# Patient Record
(demographics unavailable — no encounter records)

---

## 2022-07-02 RX ORDER — BUPROPION HYDROCHLORIDE 150 MG/1
TABLET ORAL
COMMUNITY
Start: 2021-11-17 | End: 2022-10-19 | Stop reason: ALTCHOICE

## 2022-07-02 RX ORDER — ASPIRIN 81 MG/1
TABLET, CHEWABLE ORAL
COMMUNITY

## 2022-07-02 RX ORDER — BUSPIRONE HYDROCHLORIDE 5 MG/1
TABLET ORAL
COMMUNITY
Start: 2021-11-17 | End: 2022-10-19 | Stop reason: ALTCHOICE

## 2022-07-02 RX ORDER — ZOLPIDEM TARTRATE 12.5 MG/1
TABLET, FILM COATED, EXTENDED RELEASE ORAL
COMMUNITY
Start: 2021-11-21 | End: 2022-10-19 | Stop reason: SDUPTHER

## 2022-07-22 LAB
ANION GAP SERPL CALC-SCNC: 11 MMOL/L (ref 2–17)
BASOPHILS # BLD AUTO: 0 X10E3/MCL (ref 0–0.2)
BASOPHILS NFR BLD AUTO: 0.2 % (ref 0–2)
BUN SERPL-MCNC: 10 MG/DL (ref 8–23)
CALCIUM SERPL-MCNC: 9.5 MG/DL (ref 8.8–10.2)
CHLORIDE SERPL-SCNC: 101 MMOL/L (ref 98–107)
CREAT SERPL-MCNC: 0.9 MG/DL (ref 0.5–1)
DEPRECATED HCO3 PLAS-SCNC: 27 MMOL/L (ref 22–29)
EOSINOPHIL # BLD AUTO: 0.4 X10E3/MCL (ref 0–0.5)
EOSINOPHIL NFR BLD AUTO: 2.8 % (ref 0–7)
ERYTHROCYTE [DISTWIDTH] IN BLOOD BY AUTOMATED COUNT: 13.1 % (ref 11–16)
GFR SERPL CREATININE-BSD FRML MDRD: 71 ML/MIN/1.73M²
GLUCOSE SERPL-MCNC: 200 MG/DL (ref 70–99)
HCT VFR BLD AUTO: 45.7 % (ref 34–47)
HGB BLD-MCNC: 15 G/DL (ref 11.5–15.7)
IMMATURE GRANS (ABS): 0.09 X10E3/MCL (ref 0–0.06)
IMMATURE GRANULOCYTES: 0.7 % (ref 0–0.6)
LYMPHOCYTES # SPEC AUTO: 2.1 X10E3/MCL (ref 1–3.2)
LYMPHOCYTES NFR BLD AUTO: 15.4 % (ref 15–45)
MCH RBC QN AUTO: 30.7 PG (ref 27–34.5)
MCHC RBC AUTO-ENTMCNC: 32.8 G/DL (ref 32–36)
MCV RBC AUTO: 93.6 FL (ref 81–99)
MONOCYTES # BLD AUTO: 0.9 X10E3/MCL (ref 0.3–1)
MONOCYTES NFR BLD AUTO: 6.9 % (ref 4–12)
NEUTROPHILS # BLD AUTO: 10 X10E3/MCL (ref 1.6–7.3)
NEUTROPHILS NFR BLD AUTO: 74 % (ref 42–74)
NT-PROBNP SERPL-MCNC: 55 PG/ML (ref 0–125)
OSMOLALITY SERPL CALC.SUM OF ELEC: 282 MOSM/KG (ref 270–287)
PLATELET # BLD AUTO: 262 X10E3/MCL (ref 140–440)
PMV BLD AUTO: 10.6 FL (ref 7.2–13.2)
POTASSIUM SERPL-SCNC: 3.9 MMOL/L (ref 3.5–5.3)
RBC # BLD AUTO: 4.88 X10E6/MCL (ref 3.6–5.2)
SODIUM SERPL-SCNC: 139 MMOL/L (ref 135–145)
WBC # BLD AUTO: 13.5 X10E3/MCL (ref 3.8–10.6)

## 2022-08-08 PROBLEM — J30.9 ALLERGIC RHINITIS: Status: ACTIVE | Noted: 2022-08-08

## 2022-08-08 PROBLEM — R20.2 PARESTHESIA: Status: ACTIVE | Noted: 2022-08-08

## 2022-08-08 PROBLEM — M54.41 LUMBAGO WITH SCIATICA, RIGHT SIDE: Status: ACTIVE | Noted: 2022-08-08

## 2022-08-08 PROBLEM — E55.9 VITAMIN D DEFICIENCY: Status: ACTIVE | Noted: 2022-08-08

## 2022-08-08 PROBLEM — G89.29 OTHER CHRONIC PAIN: Status: ACTIVE | Noted: 2022-08-08

## 2022-08-08 PROBLEM — F51.01 PRIMARY INSOMNIA: Status: ACTIVE | Noted: 2022-08-08

## 2022-08-08 PROBLEM — F17.200 TOBACCO USE DISORDER: Status: ACTIVE | Noted: 2022-08-08

## 2022-08-08 PROBLEM — K21.9 GERD WITHOUT ESOPHAGITIS: Status: ACTIVE | Noted: 2022-08-08

## 2022-08-08 PROBLEM — E53.8 VITAMIN B 12 DEFICIENCY: Status: ACTIVE | Noted: 2022-08-08

## 2022-10-17 NOTE — ED NOTES
ED Triage Note       ED Triage Adult Entered On:  7/22/2022 18:32 EDT    Performed On:  7/22/2022 18:31 EDT by Colleen Michelle RN, Adalid LUIS               Triage   Numeric Rating Pain Scale :   0 = No pain   Chief Complaint :   Pt ambultory to Ed with co fall 1 month ago. Pt states that she has swelling to right foot and is concerned for blood clot-sent from urgent care today    [place] [facility]) Mode of Arrival :   Private vehicle   Infectious Disease Documentation :   Document assessment   Patient received chemo or biotherapy last 48 hrs? :   No   Temperature Oral :   36.9 degC(Converted to: 98.4 degF)    Heart Rate Monitored :   70 bpm   Respiratory Rate :   18 br/min   Systolic Blood Pressure :   157 mmHg (HI)    Diastolic Blood Pressure :   99 mmHg (HI)    SpO2 :   96 %   Oxygen Therapy :   Room air   Patient presentation :   None of the above   Chief Complaint or Presentation suggest infection :   No   Dosing Weight Obtained By :   Patient stated   Weight Dosing :   82 kg(Converted to: 180 lb 12 oz)    Height :   165 cm(Converted to: 5 ft 5 in)    Body Mass Index Dosing :   30 kg/m2   Natalie LUIS - 7/22/2022 18:31 EDT   DCP GENERIC CODE   Tracking Acuity :   4   Tracking Group :   ED [id number] [place] [address], Adalid LUIS - 7/22/2022 18:31 EDT   ED General Section :   Document assessment   Pregnancy Status :   Patient denies   ED Allergies Section :   Document assessment   ED Reason for Visit Section :   Document assessment   ED Quick Assessment :   Patient appears awake, alert, oriented to baseline. Skin warm and dry. Moves all extremities. Respiration even and unlabored. Appears in no apparent distress.    Hoang Hooper RN, Adalid LUIS - 7/22/2022 18:31 EDT   ID Risk Screen Symptoms   Recent Travel History :   No recent travel   Last 14 days COVID-19 ID :   No   Natalie LUIS - 7/22/2022 18:31 EDT   Allergies   (As Of: 7/22/2022 18:32:56 EDT)   Allergies (Active)   No Known Medication Allergies  Estimated Onset Date: Unspecified ; Created By:   Xiang Shaw RN, [place]; Reaction Status:   Active ; Category:   Drug ; Substance:   No Known Medication Allergies ; Type: Allergy ; Updated By:   George Francois; Reviewed Date:   7/22/2022 18:32 EDT        Psycho-Social   Last 3 mo, thoughts killing self/others :   Patient denies   Right click within box for Suspected Abuse policy link. :   None   Feels Safe Where Live :   Yes   ED Behavioral Activity Rating Scale :   4 - Quiet and awake (normal level of activity)   George Francois - 7/22/2022 18:31 EDT   ED Reason for Visit   (As Of: 7/22/2022 18:32:56 EDT)   Diagnoses(Active)    Foot pain-swelling  Date:   7/22/2022 ; Diagnosis Type:   Reason For Visit ; Confirmation:   Complaint of ; Clinical Dx:    Foot pain-swelling ; Classification:   Medical ; Clinical Service:   Emergency medicine ; Code:   PNED ; Probability:   0 ; Diagnosis Code:   [id number]

## 2022-10-17 NOTE — ED NOTES
Patients who do not have a [facility] physician can call [phone] to schedule vaccination appointments. Follow Up Appointments:  Primary Care Provider:     Name: PCP,  NONE     Phone:                  With: Address: When:   Return to Emergency Department  In 1 day   Comments:   for DVT ultrasound of your Right leg              [address] SERVICES%>             Medications that have not changed  Other Medications  aspirin (Aspirin Low Dose 81 mg oral delayed release tablet) Oral (given by mouth) every day. Last Dose:____________________  levocetirizine (levocetirizine 5 mg oral tablet) TAKE 1 TABLET BY MOUTH ONCE DAILY. Last Dose:____________________  montelukast (montelukast 10 mg oral tablet) TAKE 1 TABLET BY MOUTH ONCE DAILY. Last Dose:____________________  zolpidem (zolpidem 12.5 mg oral tablet, extended release) TAKE 1 TABLET BY MOUTH ONCE DAILY AT BEDTIME AS NEEDED FOR 30 DAYS. Last Dose:____________________      Allergy Info: No Known Medication Allergies     Discharge Additional Information          Discharge Patient 07/22/22 19:53:00 EDT      Patient Education Materials:        Edema      Edema is when you have too much fluid in your body or under your skin. Edema may make your legs, feet, and ankles swell up. Swelling is also common in looser tissues, like around your eyes. This is a common condition. It gets more common as you get older. There are many possible causes of edema. Eating too much salt (sodium) and being on your feet or sitting for a long time can cause edema in your legs, feet, and ankles. Hot weather may make edema worse. Edema is usually painless. Your skin may look swollen or shiny. Follow these instructions at home:     Keep the swollen body part raised (elevated) above the level of your heart when you are sitting or lying down. Do not sit still or stand for a long time. Do not wear tight clothes.  Do not wear garters on your upper legs. Exercise your legs. This can help the swelling go down. Wear elastic bandages or support stockings as told by your doctor. Eat a low-salt (low-sodium) diet to reduce fluid as told by your doctor. Depending on the cause of your swelling, you may need to limit how much fluid you drink (fluid restriction). Take over-the-counter and prescription medicines only as told by your doctor. Contact a doctor if:     Treatment is not working. You have heart, liver, or kidney disease and have symptoms of edema. You have sudden and unexplained weight gain. Get help right away if:     You have shortness of breath or chest pain. You cannot breathe when you lie down. You have pain, redness, or warmth in the swollen areas. You have heart, liver, or kidney disease and get edema all of a sudden. You have a fever and your symptoms get worse all of a sudden. Summary     Edema is when you have too much fluid in your body or under your skin. Edema may make your legs, feet, and ankles swell up. Swelling is also common in looser tissues, like around your eyes. Raise (elevate) the swollen body part above the level of your heart when you are sitting or lying down. Follow your doctor's instructions about diet and how much fluid you can drink (fluid restriction). This information is not intended to replace advice given to you by your health care provider. Make sure you discuss any questions you have with your health care provider. Document Revised: 12/21/2018 Document Reviewed: 01/05/2018  Jack Patient Education ?  [facility]      ---------------------------------------------------------------------------------------------------------------------  [facility] allows patients to review your COVID and other test results as well as discharge documents from any [facility]. [facility], Emergency Department, surgical center or outpatient lab. Test results are typically available 36 hours after the test is completed. [address] encourages you to self-enroll in the [facility] Patient Portal.     To begin your self-enrollment process, please visit [email or web address] Under [facility], click on Sign up now. NOTE: You must be 16 years and older to use [facility] Self-Enroll online. If you are a parent, caregiver, or guardian; you need an invite to access your childs or dependents health records. To obtain an invite, contact the Medical Records department at [phone] Monday through Friday, 8-4:30, select option 3 . If we receive your call afterhours, we will return your call the next business day. If you have issues trying to create or access your account, contact [employer] at [phone] available 7 days a week 24 hours a day.      Comment:

## 2022-10-17 NOTE — ED PROVIDER NOTES
Foot pain-swelling        Patient:   Oneil Whitley            MRN: [id number]            FIN: [id number]               Age:   59 years     Sex:  Female     :  1957   Associated Diagnoses: Foot swelling; Leg swelling   Author:   Hollis MORALES      Basic Information   Time seen: Provider Seen (ST)   ED Provider/Time:    Jules Graham / 2022 19:04  . Additional information: Chief Complaint from Nursing Triage Note   Chief Complaint  Chief Complaint: Pt ambultory to Ed with co fall 1 month ago. Pt states that she has swelling to right foot and is concerned for blood clot-sent from urgent care today (22 18:31:00). History of Present Illness   79-year-old female presents for right lower extremity swelling x1 month after mechanical fall. Sent by urgent care for DVT study. Had negative R Lower extremity xrays just PTA. Patient also states her left foot has been intermittently swelling as well. Patient states she had a mechanical fall around 1 month ago hit her right shin on a fence has endorsed swelling ever since. No head trauma, LOC or dizziness associated with this fall. Complaining of mild discomfort and swelling. Swelling extends from right foot up to knee, mild lower extremity discomfort. Has been ambulatory but  this does cause some discomfort. She has no other associated symptoms. Denies right lower extremity numbness, tingling, chest pain, shortness of breath, abdominal pain, nausea, vomiting, diarrhea, hemoptysis, fevers, chills, recent open wounds. Denies history of DVT or PE, recent surgeries, hormone use, history of malignancy      Review of Systems   Constitutional symptoms:  No fever, no chills. Respiratory symptoms:  No shortness of breath, no orthopnea, no cough, no hemoptysis, no stridor, no wheezing. Cardiovascular symptoms:  Peripheral edema, No chest pain,    Gastrointestinal symptoms:  No abdominal pain, no nausea, no vomiting. Musculoskeletal symptoms:  Muscle pain. Neurologic symptoms:  No numbness, no tingling, no focal weakness. Additional review of systems information: All other systems reviewed and otherwise negative. Health Status   Allergies: Allergic Reactions (Selected)  No Known Medication Allergies. Past Medical/ Family/ Social History   Problem list:    No qualifying data available  . Physical Examination               Vital Signs   Vital Signs   9/15/1172 97:83 EDT Systolic Blood Pressure 439 mmHg  HI    Diastolic Blood Pressure 99 mmHg  HI    Temperature Oral 36.9 degC    Heart Rate Monitored 70 bpm    Respiratory Rate 18 br/min    SpO2 96 %   . Measurements   7/22/2022 18:32 EDT Body Mass Index est ralf 30.12 kg/m2    Body Mass Index Measured 30.12 kg/m2   7/22/2022 18:31 EDT Height/Length Measured 165 cm    Weight Dosing 82 kg   . Basic Oxygen Information   7/22/2022 18:31 EDT Oxygen Therapy Room air    SpO2 96 %   . General:  Alert, no acute distress. Skin:  Warm, dry, pink. Head:  Normocephalic. Neck:  Supple, trachea midline. Eye:  Pupils are equal, round and reactive to light, extraocular movements are intact. Ears, nose, mouth and throat:  Oral mucosa moist.   Cardiovascular:  Regular rate and rhythm, No murmur, Normal peripheral perfusion. Respiratory:  Lungs are clear to auscultation, respirations are non-labored, breath sounds are equal, Symmetrical chest wall expansion. Back:  Normal range of motion. Musculoskeletal:  Normal ROM, normal strength, no deformity, Trace pitting edema from right foot extending to right knee. Mild tenderness to palpation along the calf. Otherwise normal.  No erythema, warmth, skin discoloration, pallor, coolness. Normal range of motion throughout joints of lower extremity. Pedal pulses 2+. Normal neurovascular status distally. Can bear weight. There is also trace edema to left foot without further abnormality. Ryan Denny Chest wall   Gastrointestinal:  Soft, Nontender, Non distended, Normal bowel sounds, No organomegaly. Neurological:  Alert and oriented to person, place, time, and situation, No focal neurological deficit observed, normal sensory observed, normal motor observed. Lymphatics   Psychiatric:  Cooperative, appropriate mood & affect. Medical Decision Making   Rationale:  PA/NP reviewed with co-signing physician: diagnosis and plan of care, PA/NP reviewed with co-signing physician: radiology studies, medication, diagnosis, and plan of care, PA/NP reviewed with co-signing physician: medication(s). Documents reviewed:  Emergency department nurses' notes. Results review:  Lab results : Lab View   7/22/2022 19:21 EDT WBC 13.5 x10e3/mcL  HI    RBC 4.88 x10e6/mcL    Hgb 15.0 g/dL    HCT 45.7 %    MCV 93.6 fL    MCH 30.7 pg    MCHC 32.8 g/dL    RDW 13.1 %    Platelet 682 E38T2/KUV    MPV 10.6 fL    Neutro Auto 74.0 %    Neutro Absolute 10.0 x10e3/mcL  HI    Immature Grans Percent 0.7 %  HI    Immature Grans Absolute 0.09 x10e3/mcL  HI    Lymph Auto 15.4 %    Lymph Absolute 2.1 x10e3/mcL    Mono Auto 6.9 %    Mono Absolute 0.9 x10e3/mcL    Eosinophil Percent 2.8 %    Eos Absolute 0.4 x10e3/mcL    Basophil Auto 0.2 %    Baso Absolute 0.0 x10e3/mcL    Sodium Lvl 139 mmol/L    Potassium Lvl 3.9 mmol/L    Chloride 101 mmol/L    CO2 27 mmol/L    Glucose Random 200 mg/dL  HI    BUN 10 mg/dL    Creatinine Lvl 0.9 mg/dL    AGAP 11 mmol/L    Osmolality Calc 282 mOsm/kg    Calcium Lvl 9.5 mg/dL    eGFR 71 mL/min/1.73mÂ²  LOW    NTproBNP 55 pg/mL     . Notes:  Patient presents for right lower extremity swelling and discomfort following a mechanical fall 1 month ago. Also stating her left foot has been swelling as well and this has been ongoing. Upon entrance to room she is alert, well-appearing, nontoxic. Mildly hypertensive, vitals otherwise stable.   Exam with Trace pitting edema from right foot extending to right knee.  Mild tenderness to palpation along the calf. Otherwise normal.  No erythema, warmth, skin discoloration, pallor, coolness. Normal range of motion throughout joints of lower extremity. Pedal pulses 2+. Normal neurovascular status distally. Can bear weight. There is also trace edema to left foot without further abnormality. Otherwise unremarkable. Given that she just had x-rays today these were deferred. Labs obtained to ensure her kidney function and a BNP which was normal... Suspicion for cellulitis or infectious process low at this time, will have patient return tomorrow for DVT study. Ordered dose of Lovenox however patient refused this medication. Expressed to her the risks of refusing anticoagulant if she does have a DVT. She stated she understood and accepted risk. Patient presents for right lower extremity swelling and discomfort following a mechanical fall 1 month ago. Also stating her left foot has been swelling as well and this has been ongoing. Upon entrance to room she is alert, well-appearing, nontoxic. Mildly hypertensive, vitals otherwise stable. Exam with Trace pitting edema from right foot extending to right knee. Mild tenderness to palpation along the calf. Otherwise normal.  No erythema, warmth, skin discoloration, pallor, coolness. Normal range of motion throughout joints of lower extremity. Pedal pulses 2+. Normal neurovascular status distally. Can bear weight. There is also trace edema to left foot without further abnormality. Otherwise unremarkable. Given that she just had x-rays today these were deferred. Labs obtained to ensure her kidney function and a BNP which was normal... Suspicion for cellulitis or infectious process low at this time, will have patient return tomorrow for DVT study. Given dose of Lovenox.  .      Reexamination/ Reevaluation   Vital signs   Basic Oxygen Information   7/22/2022 18:31 EDT Oxygen Therapy Room air    SpO2 96 % Impression and Plan   Diagnosis   Foot swelling (BDR43-XB M79.89, Discharge, Medical)   Leg swelling (NBO40-PS M79.89, Discharge, Medical)   Plan   Condition: Improved, Stable. Disposition: Medically cleared, Discharged: Time  7/22/2022 19:49:00, to home. Patient was given the following educational materials: Edema, Easy-to-Read. Follow up with: Return to Emergency Department In 1 day for DVT ultrasound of your Right leg. Counseled: Patient, Regarding diagnosis, Regarding diagnostic results, Regarding treatment plan, Regarding prescription, Patient indicated understanding of instructions.     Signature Line     Electronically Signed on 07/22/2022 07:53 PM EDT   ________________________________________________   Batres Silvio C-PA      Electronically Signed on 07/22/2022 08:12 PM EDT   ________________________________________________   Batres Silivo C-PA      Electronically Signed on 07/23/2022 01:29 AM EDT   ________________________________________________   Kevin ARCHULETA            Modified by: Nilton Inman on 07/22/2022 07:51 PM EDT      Modified by: Nilton Inman on 07/22/2022 08:12 PM EDT

## 2022-10-17 NOTE — ED NOTES
ED Triage Note       ED Secondary Triage Entered On:  7/22/2022 19:26 EDT    Performed On:  7/22/2022 19:23 EDT by Alexandr FRANCES               General Information   Barriers to Learning :   Acuity of Illness   COVID-19 Vaccine Status :   2 Doses received   ED Home Meds Section :   Document assessment   [facility] ED Fall Risk Section :   Document assessment   ED Advance Directives Section :   Document assessment   ED Palliative Screen :   Document assessment   Alexandr FRANCES - 7/22/2022 19:23 EDT   (As Of: 7/22/2022 19:26:28 EDT)   Problems(Active)    Anxiety (SNOMED CT  :69506698 )  Name of Problem:   Anxiety ; Recorder:   Alexandr FRANCES; Confirmation:   Confirmed ; Classification:   Patient Stated ; Code:   [id number] ; Contributor System:   [employer] ; Last Updated:   7/22/2022 19:26 EDT ; Life Cycle Date:   7/22/2022 ; Life Cycle Status:   Active ; Vocabulary:   SNOMED CT        Bowel obstruction (SNOMED CT  :701212060 )  Name of Problem: Bowel obstruction ; Recorder:   Aelxandr FRANCES; Confirmation:   Confirmed ; Classification:   Patient Stated ; Code:   [id number] ; Contributor System:   PowerChart ; Last Updated:   7/22/2022 19:24 EDT ; Life Cycle Date:   7/22/2022 ; Life Cycle Status:   Active ; Vocabulary:   SNOMED CT        Vertigo (SNOMED CT  :4100429078 )  Name of Problem:   Vertigo ; Recorder:   Alexandr FRANCES; Confirmation:   Confirmed ; Classification:   Patient Stated ; Code:   [id number] ; Contributor System:   PowerChart ; Last Updated:   7/22/2022 19:26 EDT ; Life Cycle Date:   7/22/2022 ; Life Cycle Status:   Active ; Vocabulary:   SNOMED CT          Diagnoses(Active)    Foot pain-swelling  Date:   7/22/2022 ; Diagnosis Type:   Reason For Visit ; Confirmation:   Complaint of ; Clinical Dx:    Foot pain-swelling ; Classification:   Medical ; Clinical Service:   Emergency medicine ; Code:   PNED ; Probability:   0 ; Diagnosis Code: [id number]             -    Procedure History   (As Of: 7/22/2022 19:26:28 EDT)     Anesthesia Minutes:   0 ; Procedure Name:   Appendectomy ; Procedure Minutes:   0 ; Last Reviewed Dt/Tm:   7/22/2022 19:24:01 EDT            Anesthesia Minutes:   0 ; Procedure Name:   Hysterectomy ; Procedure Minutes:   0 ; Last Reviewed Dt/Tm:   7/22/2022 19:23:48 EDT            [facility] Fall Risk Assessment Tool   Hx of falling last 3 months ED Fall :   Yes (Single mechanical fall)   Patient confused or disoriented ED Fall :   No   Patient intoxicated or sedated ED Fall :   No   Patient impaired gait ED Fall :   No   Use a mobility assistance device ED Fall :   No   Patient altered elimination ED Fall :   No   [facility] ED Fall Score :   1    Jennifer FRANCES - 7/22/2022 19:23 EDT   ED Advance Directive   Advance Directive :   No   Jennifer FRANCES - 7/22/2022 19:23 EDT   Palliative Care   Does the Patient have a Life Limiting Illness :   None of the above   Jennifer FRANCES - 7/22/2022 19:23 EDT   Med Hx   Medication List   (As Of: 7/22/2022 19:26:28 EDT)   Home Meds    levocetirizine  :   levocetirizine ; Status:   Documented ; Ordered As Mnemonic:   levocetirizine 5 mg oral tablet ; Simple Display Line:   TAKE 1 TABLET BY MOUTH ONCE DAILY ; Catalog Code:   levocetirizine ; Order Dt/Tm:   7/22/2022 19:26:00 EDT          montelukast  :   montelukast ; Status:   Documented ; Ordered As Mnemonic:   montelukast 10 mg oral tablet ; Simple Display Line:   TAKE 1 TABLET BY MOUTH ONCE DAILY ; Catalog Code:   montelukast ; Order Dt/Tm:   7/22/2022 19:26:00 EDT          zolpidem  :   zolpidem ; Status:   Documented ; Ordered As Mnemonic:   zolpidem 12.5 mg oral tablet, extended release ; Simple Display Line:   TAKE 1 TABLET BY MOUTH ONCE DAILY AT BEDTIME AS NEEDED FOR 30 DAYS ; Catalog Code:   zolpidem ; Order Dt/Tm:   7/22/2022 19:26:00 EDT          aspirin  :   aspirin ; Status:   Documented ; Ordered As Mnemonic:   Aspirin Low Dose 81 mg oral delayed release tablet ; Simple Display Line:   mg, tabs, Oral, Daily, 0 Refill(s) ;  Catalog Code:   aspirin ; Order Dt/Tm:   7/22/2022 19:25:54 EDT

## 2022-10-17 NOTE — DISCHARGE SUMMARY
ED Clinical Summary                         [facility]  [address], [place], [address] [phone]           PERSON INFORMATION  Name: Mariel Valdez Age:  59 Years : 1957   Sex: Female Language: English PCP: PCP,  NONE   Marital Status:  Unknown Phone: [phone] Med Service: MED-Medicine   MRN:  [id number] Acct# [[id number]] Arrival: 2022 18:21:00   Visit Reason: Foot pain-swelling; SWOLLEN FEET Acuity: 4 LOS: 000 01:54   Address:      [facility] [id number] [id number]  Diagnosis:      Foot swelling; Leg swelling  Printed Prescriptions: Allergies      No Known Medication Allergies      Medications Administered During Visit:        Patient Medication List:              aspirin (Aspirin Low Dose 81 mg oral delayed release tablet) Oral (given by mouth) every day. levocetirizine (levocetirizine 5 mg oral tablet) TAKE 1 TABLET BY MOUTH ONCE DAILY. montelukast (montelukast 10 mg oral tablet) TAKE 1 TABLET BY MOUTH ONCE DAILY. zolpidem (zolpidem 12.5 mg oral tablet, extended release) TAKE 1 TABLET BY MOUTH ONCE DAILY AT BEDTIME AS NEEDED FOR 30 DAYS. Major Tests and Procedures: The following procedures and tests were performed during your ED visit. COMMONPROCEDURES%>  COMMON PROCEDURESCOMMENTS%>          Laboratory Orders  Name Status Details   BMP Completed Blood, Stat, ST - Stat, 22 19:09:00 EDT, 22 19:09:00 EDT, Nurse AVEL so ALEXA C-PA, Print label Y/N   CBCDIFF Completed Blood, Stat, ST - Stat, 22 19:09:00 EDT, 22 19:09:00 EDT, AVEL Estrada ALEXA C-PA, Print label Y/N   NTproBNP Completed Blood, Stat, ST - Stat, 22 19:09:00 EDT, 22 19:09:00 EDT, Nurse Jasbir so, Print label Y/N               Radiology Orders  No radiology orders were placed.               Patient Care Orders  Name Status Details   Discharge Patient Ordered 22 19:53:00 EDT   ED Assessment Adult Completed 07/22/22 18:32:57 EDT, 07/22/22 18:32:57 EDT   ED Secondary Triage Completed 07/22/22 18:32:57 EDT, 07/22/22 18:32:57 EDT   ED Triage Adult Completed 07/22/22 18:22:19 EDT, 07/22/22 18:22:19 EDT   Patient Specific Fall Safety Measures Completed 07/22/22 19:26:28 EDT, Once, 07/22/22 48:56:87 EDT, ED Low Fall Risk Documentation             PROVIDER INFORMATION               Provider Role Assigned Laura MORALES ED MidLevel 7/22/2022 19:04:02    Amanda FRANCES ED Nurse 7/22/2022 19:04:33        Attending Physician:  DEFAULT,  DOCTOR     Admit Doc  DEFAULT,  DOCTOR     Consulting Doc       VITALS INFORMATION  Vital Sign Triage Latest   Temp Oral ORAL_1%>36.9 degC ORAL%>36.9 degC   Temp Temporal TEMPORAL_1%> TEMPORAL%>   Temp Intravascular INTRAVASCULAR_1%> INTRAVASCULAR%>   Temp Axillary AXILLARY_1%> AXILLARY%>   Temp Rectal RECTAL_1%> RECTAL%>   02 Sat 96 % 96 %   Respiratory Rate RATE_1%>18 br/min RATE%>16 br/min   Peripheral Pulse Rate PULSE RATE_1%> PULSE RATE%>   Apical Heart Rate HEART RATE_1%> HEART RATE%>   Blood Pressure BLOOD PRESSURE_1%>/ BLOOD PRESSURE_1%>99 mmHg BLOOD PRESSURE%>141 mmHg / BLOOD PRESSURE%>68 mmHg                 Immunizations      No Immunizations Documented This Visit          DISCHARGE INFORMATION   Discharge Disposition: H Outpt-Sent Home   Discharge Location:    Home   Discharge Date and Time:    7/22/2022 20:15:00   ED Checkout Date and Time:    7/22/2022 20:15:00     DEPART REASON INCOMPLETE INFORMATION               Depart Action Incomplete Reason   Interactive View/I&O MD Decision to leave incomplete               Problems      No Problems Documented              Smoking Status      No Smoking Status Documented         PATIENT EDUCATION INFORMATION  Instructions:       Edema, Easy-to-Read     Follow up:                    With: Address: When:   Return to Emergency Department  In 1 day   Comments:   for DVT ultrasound of your Right leg           ED PROVIDER DOCUMENTATION     Patient:   Valentino Points            MRN: [id number]            FIN: [id number]               Age:   59 years     Sex:  Female     :  1957   Associated Diagnoses: Foot swelling; Leg swelling   Author:   Wanda MORALES      Basic Information   Time seen: Provider Seen (ST)   ED Provider/Time:    Dea Stephenson / 2022 19:04  . Additional information: Chief Complaint from Nursing Triage Note   Chief Complaint  Chief Complaint: Pt ambultory to Ed with co fall 1 month ago. Pt states that she has swelling to right foot and is concerned for blood clot-sent from urgent care today (22 18:31:00). History of Present Illness   70-year-old female presents for right lower extremity swelling x1 month after mechanical fall. Sent by urgent care for DVT study. Had negative R Lower extremity xrays just PTA. Patient also states her left foot has been intermittently swelling as well. Patient states she had a mechanical fall around 1 month ago hit her right shin on a fence has endorsed swelling ever since. No head trauma, LOC or dizziness associated with this fall. Complaining of mild discomfort and swelling. Swelling extends from right foot up to knee, mild lower extremity discomfort. Has been ambulatory but  this does cause some discomfort. She has no other associated symptoms. Denies right lower extremity numbness, tingling, chest pain, shortness of breath, abdominal pain, nausea, vomiting, diarrhea, hemoptysis, fevers, chills, recent open wounds. Denies history of DVT or PE, recent surgeries, hormone use, history of malignancy      Review of Systems   Constitutional symptoms:  No fever, no chills. Respiratory symptoms:  No shortness of breath, no orthopnea, no cough, no hemoptysis, no stridor, no wheezing.     Cardiovascular symptoms:  Peripheral edema, No chest pain,    Gastrointestinal symptoms:  No abdominal pain, no nausea, no vomiting. Musculoskeletal symptoms:  Muscle pain. Neurologic symptoms:  No numbness, no tingling, no focal weakness. Additional review of systems information: All other systems reviewed and otherwise negative. Health Status   Allergies: Allergic Reactions (Selected)  No Known Medication Allergies. Past Medical/ Family/ Social History   Problem list:    No qualifying data available  . Physical Examination               Vital Signs   Vital Signs   8/01/9010 13:37 EDT Systolic Blood Pressure 721 mmHg  HI    Diastolic Blood Pressure 99 mmHg  HI    Temperature Oral 36.9 degC    Heart Rate Monitored 70 bpm    Respiratory Rate 18 br/min    SpO2 96 %   . Measurements   7/22/2022 18:32 EDT Body Mass Index est ralf 30.12 kg/m2    Body Mass Index Measured 30.12 kg/m2   7/22/2022 18:31 EDT Height/Length Measured 165 cm    Weight Dosing 82 kg   . Basic Oxygen Information   7/22/2022 18:31 EDT Oxygen Therapy Room air    SpO2 96 %   . General:  Alert, no acute distress. Skin:  Warm, dry, pink. Head:  Normocephalic. Neck:  Supple, trachea midline. Eye:  Pupils are equal, round and reactive to light, extraocular movements are intact. Ears, nose, mouth and throat:  Oral mucosa moist.   Cardiovascular:  Regular rate and rhythm, No murmur, Normal peripheral perfusion. Respiratory:  Lungs are clear to auscultation, respirations are non-labored, breath sounds are equal, Symmetrical chest wall expansion. Back:  Normal range of motion. Musculoskeletal:  Normal ROM, normal strength, no deformity, Trace pitting edema from right foot extending to right knee. Mild tenderness to palpation along the calf. Otherwise normal.  No erythema, warmth, skin discoloration, pallor, coolness. Normal range of motion throughout joints of lower extremity. Pedal pulses 2+. Normal neurovascular status distally. Can bear weight.   There is also trace edema to left from right foot extending to right knee. Mild tenderness to palpation along the calf. Otherwise normal.  No erythema, warmth, skin discoloration, pallor, coolness. Normal range of motion throughout joints of lower extremity. Pedal pulses 2+. Normal neurovascular status distally. Can bear weight. There is also trace edema to left foot without further abnormality. Otherwise unremarkable. Given that she just had x-rays today these were deferred. Labs obtained to ensure her kidney function and a BNP which was normal... Suspicion for cellulitis or infectious process low at this time, will have patient return tomorrow for DVT study. Ordered dose of Lovenox however patient refused this medication. Expressed to her the risks of refusing anticoagulant if she does have a DVT. She stated she understood and accepted risk. Patient presents for right lower extremity swelling and discomfort following a mechanical fall 1 month ago. Also stating her left foot has been swelling as well and this has been ongoing. Upon entrance to room she is alert, well-appearing, nontoxic. Mildly hypertensive, vitals otherwise stable. Exam with Trace pitting edema from right foot extending to right knee. Mild tenderness to palpation along the calf. Otherwise normal.  No erythema, warmth, skin discoloration, pallor, coolness. Normal range of motion throughout joints of lower extremity. Pedal pulses 2+. Normal neurovascular status distally. Can bear weight. There is also trace edema to left foot without further abnormality. Otherwise unremarkable. Given that she just had x-rays today these were deferred. Labs obtained to ensure her kidney function and a BNP which was normal... Suspicion for cellulitis or infectious process low at this time, will have patient return tomorrow for DVT study. Given dose of Lovenox.  .      Reexamination/ Reevaluation   Vital signs   Basic Oxygen Information   7/22/2022 18:31 EDT Oxygen Therapy Room air    SpO2 96 %         Impression and Plan   Diagnosis   Foot swelling ([id number] M79.89, Discharge, Medical)   Leg swelling ([id number] M79.89, Discharge, Medical)   Plan   Condition: Improved, Stable. Disposition: Medically cleared, Discharged: Time  7/22/2022 19:49:00, to home. Patient was given the following educational materials: Edema, Easy-to-Read. Follow up with: Return to Emergency Department In 1 day for DVT ultrasound of your Right leg. Counseled: Patient, Regarding diagnosis, Regarding diagnostic results, Regarding treatment plan, Regarding prescription, Patient indicated understanding of instructions.